# Patient Record
Sex: MALE | Race: WHITE
[De-identification: names, ages, dates, MRNs, and addresses within clinical notes are randomized per-mention and may not be internally consistent; named-entity substitution may affect disease eponyms.]

---

## 2017-12-14 ENCOUNTER — HOSPITAL ENCOUNTER (EMERGENCY)
Dept: HOSPITAL 8 - ED | Age: 27
Discharge: HOME | End: 2017-12-14
Payer: SELF-PAY

## 2017-12-14 VITALS — HEIGHT: 70 IN | WEIGHT: 167.11 LBS | BODY MASS INDEX: 23.92 KG/M2

## 2017-12-14 VITALS — SYSTOLIC BLOOD PRESSURE: 124 MMHG | DIASTOLIC BLOOD PRESSURE: 75 MMHG

## 2017-12-14 DIAGNOSIS — R07.2: Primary | ICD-10-CM

## 2017-12-14 LAB
BUN SERPL-MCNC: 12 MG/DL (ref 7–18)
HCT VFR BLD CALC: 43.1 % (ref 39.2–51.8)
HGB BLD-MCNC: 14.8 G/DL (ref 13.7–18)
IS PT STATUS REG ER OR PRE ER?: YES
WBC # BLD AUTO: 8.9 X10^3/UL (ref 3.4–10)

## 2017-12-14 PROCEDURE — 71020: CPT

## 2017-12-14 PROCEDURE — 93005 ELECTROCARDIOGRAM TRACING: CPT

## 2017-12-14 PROCEDURE — 87400 INFLUENZA A/B EACH AG IA: CPT

## 2017-12-14 PROCEDURE — 96372 THER/PROPH/DIAG INJ SC/IM: CPT

## 2017-12-14 PROCEDURE — 99285 EMERGENCY DEPT VISIT HI MDM: CPT

## 2017-12-14 PROCEDURE — 36415 COLL VENOUS BLD VENIPUNCTURE: CPT

## 2017-12-14 PROCEDURE — 85025 COMPLETE CBC W/AUTO DIFF WBC: CPT

## 2017-12-14 PROCEDURE — 84484 ASSAY OF TROPONIN QUANT: CPT

## 2017-12-14 PROCEDURE — 82040 ASSAY OF SERUM ALBUMIN: CPT

## 2017-12-14 PROCEDURE — 80048 BASIC METABOLIC PNL TOTAL CA: CPT

## 2018-08-21 ENCOUNTER — HOSPITAL ENCOUNTER (EMERGENCY)
Dept: HOSPITAL 8 - ED | Age: 28
Discharge: HOME | End: 2018-08-21
Payer: SELF-PAY

## 2018-08-21 VITALS — SYSTOLIC BLOOD PRESSURE: 107 MMHG | DIASTOLIC BLOOD PRESSURE: 72 MMHG

## 2018-08-21 VITALS — WEIGHT: 152.12 LBS | BODY MASS INDEX: 21.78 KG/M2 | HEIGHT: 70 IN

## 2018-08-21 DIAGNOSIS — F17.200: ICD-10-CM

## 2018-08-21 DIAGNOSIS — H10.023: Primary | ICD-10-CM

## 2018-08-21 PROCEDURE — 99283 EMERGENCY DEPT VISIT LOW MDM: CPT

## 2018-09-13 ENCOUNTER — HOSPITAL ENCOUNTER (EMERGENCY)
Dept: HOSPITAL 8 - ED | Age: 28
Discharge: HOME | End: 2018-09-13
Payer: MEDICAID

## 2018-09-13 VITALS — BODY MASS INDEX: 21.46 KG/M2 | HEIGHT: 70 IN | WEIGHT: 149.91 LBS

## 2018-09-13 VITALS — SYSTOLIC BLOOD PRESSURE: 117 MMHG | DIASTOLIC BLOOD PRESSURE: 66 MMHG

## 2018-09-13 DIAGNOSIS — F17.210: ICD-10-CM

## 2018-09-13 DIAGNOSIS — N45.3: Primary | ICD-10-CM

## 2018-09-13 DIAGNOSIS — N34.2: ICD-10-CM

## 2018-09-13 LAB
CULTURE INDICATED?: YES
MICROSCOPIC: (no result)

## 2018-09-13 PROCEDURE — 87591 N.GONORRHOEAE DNA AMP PROB: CPT

## 2018-09-13 PROCEDURE — 81001 URINALYSIS AUTO W/SCOPE: CPT

## 2018-09-13 PROCEDURE — 99285 EMERGENCY DEPT VISIT HI MDM: CPT

## 2018-09-13 PROCEDURE — 76870 US EXAM SCROTUM: CPT

## 2018-09-13 PROCEDURE — 87086 URINE CULTURE/COLONY COUNT: CPT

## 2018-09-13 PROCEDURE — 87491 CHLMYD TRACH DNA AMP PROBE: CPT

## 2018-09-13 PROCEDURE — 96372 THER/PROPH/DIAG INJ SC/IM: CPT

## 2019-12-01 ENCOUNTER — HOSPITAL ENCOUNTER (EMERGENCY)
Dept: HOSPITAL 8 - ED | Age: 29
Discharge: HOME | End: 2019-12-01
Payer: MEDICAID

## 2019-12-01 VITALS — SYSTOLIC BLOOD PRESSURE: 124 MMHG | DIASTOLIC BLOOD PRESSURE: 76 MMHG

## 2019-12-01 VITALS — WEIGHT: 165.35 LBS | HEIGHT: 70 IN | BODY MASS INDEX: 23.67 KG/M2

## 2019-12-01 DIAGNOSIS — N45.3: Primary | ICD-10-CM

## 2019-12-01 LAB
CULTURE INDICATED?: YES
MICROSCOPIC: (no result)

## 2019-12-01 PROCEDURE — 81001 URINALYSIS AUTO W/SCOPE: CPT

## 2019-12-01 PROCEDURE — 87077 CULTURE AEROBIC IDENTIFY: CPT

## 2019-12-01 PROCEDURE — 96372 THER/PROPH/DIAG INJ SC/IM: CPT

## 2019-12-01 PROCEDURE — 76870 US EXAM SCROTUM: CPT

## 2019-12-01 PROCEDURE — 87086 URINE CULTURE/COLONY COUNT: CPT

## 2019-12-01 PROCEDURE — 99284 EMERGENCY DEPT VISIT MOD MDM: CPT

## 2019-12-01 NOTE — NUR
pt reports testicular pain level 4/10 at this time, pt a&o, resps even and 
unlabored, nadn. awaiting UA results and dispo at this time.

## 2019-12-01 NOTE — NUR
LATE ENTRY D/T PATIENT CARE: THIS RN ATTEMPTED TO DISCHARGE PT, PT REPORTING 
RETURN OF SEVERE TESTICULAR PAIN. JESSICA ALEJO INFORMED, ORDER RECEIVED FOR 
OXYCODONE. REPORT GIVEN TO MIGUELINA ENCINAS.

## 2019-12-01 NOTE — NUR
report received from MIGUELINA Hightower, assuming care at this time. ERP Harish notified 
of patient complaint of discharge from urethra, ERP declines to order 
gc/chlamydia at this time. pt resting on gurney, a&o, resps even and unlabored. 
pt educated to provide clean catch urine when able, supplies at bedside. pt 
denies urge to void at this time.

## 2019-12-01 NOTE — NUR
DESPITE RX, PT CONTINUES TO REPORT 9/10 PAIN. PT MEDICATED PER EMAR AND 
ASSISTED TO DC. PT TO TAKE TAXI HOME

## 2019-12-01 NOTE — NUR
PT BIB [facility] FOR TESTICULAR PAIN FOR 3 DAYS WITH WHITE DC. PT'S LEFT TESTICLE 
IS LARGER THAN RIGHT AND MORE TENDER. FEVER .2.  CHART UP FOR MD.

## 2025-07-08 ENCOUNTER — NON-PROVIDER VISIT (OUTPATIENT)
Dept: OCCUPATIONAL MEDICINE | Facility: CLINIC | Age: 35
End: 2025-07-08

## 2025-07-08 DIAGNOSIS — Z02.1 PRE-EMPLOYMENT DRUG SCREENING: Primary | ICD-10-CM

## 2025-07-08 LAB
AMP AMPHETAMINE: NORMAL
COC COCAINE: NORMAL
INT CON NEG: NORMAL
INT CON POS: NORMAL
MET METHAMPHETAMINES: NORMAL
OPI OPIATES: NORMAL
PCP PHENCYCLIDINE: NORMAL
POC DRUG COMMENT 753798-OCCUPATIONAL HEALTH: NORMAL
THC: NORMAL

## 2025-07-08 PROCEDURE — 80305 DRUG TEST PRSMV DIR OPT OBS: CPT | Performed by: PREVENTIVE MEDICINE
